# Patient Record
Sex: MALE | Race: WHITE | HISPANIC OR LATINO | URBAN - METROPOLITAN AREA
[De-identification: names, ages, dates, MRNs, and addresses within clinical notes are randomized per-mention and may not be internally consistent; named-entity substitution may affect disease eponyms.]

---

## 2020-06-12 ENCOUNTER — EMERGENCY (EMERGENCY)
Facility: HOSPITAL | Age: 20
LOS: 1 days | Discharge: HOME | End: 2020-06-12
Attending: EMERGENCY MEDICINE | Admitting: EMERGENCY MEDICINE
Payer: COMMERCIAL

## 2020-06-12 VITALS
TEMPERATURE: 98 F | HEART RATE: 67 BPM | OXYGEN SATURATION: 100 % | SYSTOLIC BLOOD PRESSURE: 136 MMHG | RESPIRATION RATE: 16 BRPM | DIASTOLIC BLOOD PRESSURE: 69 MMHG

## 2020-06-12 VITALS
HEIGHT: 68 IN | SYSTOLIC BLOOD PRESSURE: 126 MMHG | WEIGHT: 164.91 LBS | HEART RATE: 84 BPM | RESPIRATION RATE: 16 BRPM | DIASTOLIC BLOOD PRESSURE: 74 MMHG | TEMPERATURE: 98 F | OXYGEN SATURATION: 100 %

## 2020-06-12 PROCEDURE — 73140 X-RAY EXAM OF FINGER(S): CPT | Mod: 26,RT

## 2020-06-12 PROCEDURE — 26370 REPAIR FINGER/HAND TENDON: CPT | Mod: F8

## 2020-06-12 PROCEDURE — 73140 X-RAY EXAM OF FINGER(S): CPT

## 2020-06-12 PROCEDURE — 64831 REPAIR OF DIGIT NERVE: CPT | Mod: F8

## 2020-06-12 PROCEDURE — 37618 LIGATION MAJOR ARTERY XTR: CPT | Mod: F8

## 2020-06-12 PROCEDURE — 99285 EMERGENCY DEPT VISIT HI MDM: CPT | Mod: 25

## 2020-06-12 RX ORDER — LIDOCAINE HCL 20 MG/ML
10 VIAL (ML) INJECTION ONCE
Refills: 0 | Status: COMPLETED | OUTPATIENT
Start: 2020-06-12 | End: 2020-06-12

## 2020-06-12 RX ADMIN — Medication 10 MILLILITER(S): at 23:18

## 2020-06-12 NOTE — ED PROVIDER NOTE - ATTENDING CONTRIBUTION TO CARE
Pt is a 20 yo male who presents to the ED with a cc of right hand laceration.  Pt reports that he has no significant past medical history.  Pt states that he was drinking today and that he cut his right hand 4th digit on a broken glass beer bottle.  He reports continued bleeding, with a diminished tingling sensation to the distal aspect of the tip although the pt reports that he can feel gross sensation.  He has been applying significant pressure to the site.  Pt is right hand dominant.  Pt is UTD with his Tetanus.  Denies lightheadedness, CP, SOB, abd pain.  On exam pt lying in bed anxious because he states that he does not like seeing blood in NAD, right hand 4th digit: 1.5 cm horizontal laceration noted to the volar aspect of DIP full ROM reports subject diminished sensation to distal aspect, cap refill less then 2 seconds, + active pulsatile bleed noted appears to be superficial arterial bleed.  +radial pulse.  No dru TTP.  Pt with right hand 4th digit laceration with reports subjective diminished sensation and what appears to be superficial arterial bleed. Will obtain x-ray of hand and consult hand surgery.  Pressure being applied at this time

## 2020-06-12 NOTE — ED ADULT NURSE NOTE - OBJECTIVE STATEMENT
Patient came in to ED c/o laceration of the right 4th finger with a broken glass today. Noted with bandage applied to the affected finger awaiting to be seen by EDMD.

## 2020-06-12 NOTE — ED PROVIDER NOTE - PATIENT PORTAL LINK FT
You can access the FollowMyHealth Patient Portal offered by [facility] by registering at the following website: [email or web address]. By joining [employer]’s FollowMyHealth portal, you will also be able to view your health information using other applications (apps) compatible with our system.

## 2020-06-12 NOTE — ED PROVIDER NOTE - CARE PROVIDER_API CALL
Dariel Arvizu  PLASTIC SURGERY  [address] [place] [address] [address] [place] [id number]  Phone: [phone]  Fax: [phone]  Follow Up Time: 1-3 Days

## 2020-06-12 NOTE — ED PROVIDER NOTE - OBJECTIVE STATEMENT
18 y/o male right hand dominant presents today c/o right 4th digit laceration. pt notes he drank 3 beers and cut his finger with broken glass. pt reports he is UTD with tetanus. pt notes throbbing pain to laceration, aching, and currently 5/10. pt reports active blood loss. pt reports mild decreased sensation to finger. Pt denies head injury, LOC, fever, chills, foreign body, discharge, vomiting, Cp, sob, or any other complaints.